# Patient Record
Sex: MALE | Race: OTHER | Employment: FULL TIME | URBAN - METROPOLITAN AREA
[De-identification: names, ages, dates, MRNs, and addresses within clinical notes are randomized per-mention and may not be internally consistent; named-entity substitution may affect disease eponyms.]

---

## 2017-05-27 ENCOUNTER — APPOINTMENT (OUTPATIENT)
Dept: LAB | Age: 32
End: 2017-05-27
Attending: INTERNAL MEDICINE
Payer: COMMERCIAL

## 2017-05-27 ENCOUNTER — OFFICE VISIT (OUTPATIENT)
Dept: FAMILY MEDICINE CLINIC | Facility: CLINIC | Age: 32
End: 2017-05-27

## 2017-05-27 VITALS
DIASTOLIC BLOOD PRESSURE: 66 MMHG | SYSTOLIC BLOOD PRESSURE: 130 MMHG | HEIGHT: 69 IN | HEART RATE: 78 BPM | RESPIRATION RATE: 16 BRPM | WEIGHT: 169 LBS | BODY MASS INDEX: 25.03 KG/M2 | TEMPERATURE: 98 F

## 2017-05-27 DIAGNOSIS — E78.5 DYSLIPIDEMIA: ICD-10-CM

## 2017-05-27 DIAGNOSIS — I10 ESSENTIAL HYPERTENSION: Primary | ICD-10-CM

## 2017-05-27 DIAGNOSIS — I10 ESSENTIAL HYPERTENSION: ICD-10-CM

## 2017-05-27 DIAGNOSIS — F41.9 ANXIETY: ICD-10-CM

## 2017-05-27 PROCEDURE — 99213 OFFICE O/P EST LOW 20 MIN: CPT | Performed by: INTERNAL MEDICINE

## 2017-05-27 PROCEDURE — 36415 COLL VENOUS BLD VENIPUNCTURE: CPT | Performed by: INTERNAL MEDICINE

## 2017-05-27 RX ORDER — CLONAZEPAM 0.5 MG/1
0.5 TABLET ORAL 2 TIMES DAILY PRN
Qty: 12 TABLET | Refills: 1 | Status: SHIPPED
Start: 2017-05-27

## 2017-05-27 RX ORDER — LISINOPRIL 5 MG/1
5 TABLET ORAL DAILY
Qty: 90 TABLET | Refills: 0 | Status: SHIPPED | OUTPATIENT
Start: 2017-05-27 | End: 2017-07-05

## 2017-05-27 NOTE — PROGRESS NOTES
[facility] Internal Medicine Office Note  Chief Complaint:   Patient presents with:  Blood Pressure  Anxiety: flight anxiety (needs refill on klonopin)       HPI:   This is a 29year old male coming in for  HPI  Anxiety- stable for airplanes need (primary encounter diagnosis)  Dyslipidemia      The plan is as follows  Magdaleno Santiago was seen today for blood pressure and anxiety.     Diagnoses and all orders for this visit:    Essential hypertension decrease chronic stable issue, continue present management all    Feeling down, depressed, or hopeless (over the last two weeks)?: Not at all    PHQ-2 SCORE: 0

## 2017-05-27 NOTE — PATIENT INSTRUCTIONS
Thank you for choosing Ethan Harrison MD at [place] [address]  To Do: Andres Ribeiro  1. Change lisinopril 5mg daily  2. Track your BP everyday in morning, goal BP is under 140/90, so call in 1 month or message with BP readings  Read below  3.    High Hypertension can damage the very small blood vessels in the retina.   Bleeding from the aorta, the large blood vessel that supplies blood to the abdomen, pelvis, and legs   Heart failure   Poor blood supply to the legs  Erectile Dysfunction  Problems with y (4,700 milligrams (mg)/day), calcium (1,250 mg/day), and magnesium (500 mg/day) and much less sodium (salt) than the typical American diet. Limit sodium to no more than 2,300 mg a day (eating only 1,500 mg a day is an even better goal).    Reduce saturated vegetables or 1/2 cup cut-up raw or cooked vegetables.    • Don't think of vegetables only as side dishes — a hearty blend of vegetables served over brown rice or whole-wheat noodles can serve as the main dish for a meal.   • Fresh or frozen vegetables are choose lactose-free products or consider taking an over-the-counter product that contains the enzyme lactase, which can reduce or prevent the symptoms of lactose intolerance.    • Go easy on regular and even fat-free cheeses because they are typically high complete protein, just like meat. They also contain isoflavones, a type of natural plant compound (phytochemical) that has been shown to have some health benefits.    Fats and oils (2 to 3 servings a day)  Fat helps your body absorb essential vitamins and h nutritious beverage such as low-fat milk or even plain water. • Cut back on added sugar, which has no nutritional value but can pack on calories. DASH diet: Alcohol and caffeine  Drinking too much alcohol can increase blood pressure.  The DASH diet davis healthy — often have lots of sodium. You may not notice a difference in taste when you choose low-sodium food and beverages.  If things seem too bland, gradually introduce low-sodium foods and cut back on table salt until you reach your sodium goal. That' and to notify us and stop treatment if problems arise, but know that our intention is that the benefits outweigh those potential risks and we strive to make you healthier and to improve your quality of life. Referrals, and Radiology Information:     If y

## 2018-01-13 PROBLEM — J06.9 VIRAL URI: Status: ACTIVE | Noted: 2018-01-13

## (undated) NOTE — MR AVS SNAPSHOT
[facility] [address]  [address] [facility]  [phone]               Thank you for choosing us for your health care visit with Mark Springer MD.  We are glad to serve you and happy to provide you with t pressure of 100 or greater. What Health Problems Are Associated With High Blood Pressure? Several potentially serious health conditions are linked to high blood pressure, including:   Atherosclerosis: a disease of the arteries caused by a buildup of plaqu fine. If your blood pressure is normal, you can work with your health care team to keep it that way. If your blood pressure is too high, treatment may help prevent damage to your body's organs.     By [facility] staff   DASH stands for Dietary Approaches t calories). Fish, skinless poultry, and soy products are the best protein sources. Other daily nutrient goals in the DASH diet include limiting carbohydrates to 55% of daily calories and dietary cholesterol to 150 mg.  Try to get at least 30 grams (g) of d Examples of one serving include 1 medium fruit or 1/2 cup fresh, frozen or canned fruit. ? Have a piece of fruit with meals and one as a snack, then round out your day with a dessert of fresh fruits topped with a splash of low-fat yogurt.    ? Leave on ed ? Trim away skin and fat from meat and then broil, grill, roast or poach instead of frying. ? Eat heart-healthy fish, such as salmon, herring and tuna. These types of fish are high in omega-3 fatty acids, which can help lower your total cholesterol.    Nu your blood cholesterol and increasing your risk of coronary artery disease.  DASH helps keep your daily saturated fat to less than 10 percent of your total calories by limiting use of meat, butter, cheese, whole milk, cream and eggs in your diet, along with The DASH diet is not designed to promote weight loss, but it can be used as part of an overall weight-loss strategy. The DASH diet is based on a diet of about 2,000 calories a day.  If you're trying to lose weight, though, you may want to eat around 1,600 a consider to use open access scheduling which is through the [employer] website [email or web address] and type in Augustina Day MD and follow the links for \"SCHEDULE ONLINE NOW\"    •The Lab is here [address] Mon, Tues, Friday 8am-4pm in office, Wed and Thurs 7am- please call Central Scheduling at [phone]  Please allow our office 5 business days to contact you regarding any testing results.     Refill policies:   Allow 3 business days for refills; controlled substances may take longer and must be picked up from discharge instructions in [employer] by going to Visits < Admission Summaries. If you've been to the Emergency Department or your doctor's office, you can view your past visit information in [employer] by going to Visits < Visit Summaries. [employer] questions?